# Patient Record
Sex: MALE | URBAN - METROPOLITAN AREA
[De-identification: names, ages, dates, MRNs, and addresses within clinical notes are randomized per-mention and may not be internally consistent; named-entity substitution may affect disease eponyms.]

---

## 2021-04-08 ENCOUNTER — APPOINTMENT (OUTPATIENT)
Dept: URBAN - METROPOLITAN AREA CLINIC 186 | Age: 31
Setting detail: DERMATOLOGY
End: 2021-04-08

## 2021-04-08 VITALS — TEMPERATURE: 97.7 F

## 2021-04-08 DIAGNOSIS — L91.0 HYPERTROPHIC SCAR: ICD-10-CM

## 2021-04-08 PROCEDURE — OTHER COUNSELING: OTHER

## 2021-04-08 PROCEDURE — OTHER MIPS QUALITY: OTHER

## 2021-04-08 PROCEDURE — 99202 OFFICE O/P NEW SF 15 MIN: CPT

## 2021-04-08 PROCEDURE — OTHER ADDITIONAL NOTES: OTHER

## 2021-04-08 ASSESSMENT — LOCATION DETAILED DESCRIPTION DERM
LOCATION DETAILED: LEFT MEDIAL SUPERIOR CHEST
LOCATION DETAILED: RIGHT MEDIAL INFERIOR CHEST

## 2021-04-08 ASSESSMENT — LOCATION ZONE DERM: LOCATION ZONE: TRUNK

## 2021-04-08 ASSESSMENT — LOCATION SIMPLE DESCRIPTION DERM: LOCATION SIMPLE: CHEST

## 2021-04-08 NOTE — PROCEDURE: ADDITIONAL NOTES
Detail Level: Simple
Additional Notes: Physician correspondence enclosed to Dr. Rosa Harding
Additional Notes: We discussed the risks and benefits of CO2 including but not limited to temporary or permanent lightening or darkening of treated skin, crusting, scabbing, scarring, reactivation of cold sores, infection, persistent redness, acne flares, milia formation. Strict sun avoidance and protection emphasized. Pt aware of post-operative pain, swelling and redness (which can last days) post treatment. No guarantees can be made and responses vary from patient to patient. Patient aware of alternatives to CO2 treatment including sun protection, IPL and PDL. Patient understands that the treatment is cosmetic in nature and not covered by insurance.
Render Risk Assessment In Note?: no
Additional Notes: Discussed hypertrophic/keloid scars can occur from past acne spots.  Reassurance provided that they are benign. Recommended silicone sheets to help flatten out the scars. Discussed intralesional kenalog  to thin out the tissue, patient aware multiple sessions would be needed. Discussed risks of ilk including atrophy, hyperpigmentation and hypopigmentation. Discussed Microneedling and c02 laser as alternatives. Discussed removal options, but expressed that it may be considered cosmetic and that the fee is PATOS.